# Patient Record
Sex: FEMALE | URBAN - METROPOLITAN AREA
[De-identification: names, ages, dates, MRNs, and addresses within clinical notes are randomized per-mention and may not be internally consistent; named-entity substitution may affect disease eponyms.]

---

## 2017-05-19 ENCOUNTER — APPOINTMENT (OUTPATIENT)
Age: 42
Setting detail: DERMATOLOGY
End: 2017-05-19

## 2017-05-19 ENCOUNTER — APPOINTMENT (RX ONLY)
Dept: URBAN - METROPOLITAN AREA CLINIC 168 | Facility: CLINIC | Age: 42
Setting detail: DERMATOLOGY
End: 2017-05-19

## 2017-05-19 ENCOUNTER — APPOINTMENT (RX ONLY)
Dept: URBAN - METROPOLITAN AREA CLINIC 77 | Facility: CLINIC | Age: 42
Setting detail: DERMATOLOGY
End: 2017-05-19

## 2017-05-19 DIAGNOSIS — L66.81 CENTRAL CENTRIFUGAL CICATRICIAL ALOPECIA: ICD-10-CM

## 2017-05-19 PROBLEM — Z85.3 PERSONAL HISTORY OF MALIGNANT NEOPLASM OF BREAST: Status: ACTIVE | Noted: 2017-05-19

## 2017-05-19 PROBLEM — I10 ESSENTIAL (PRIMARY) HYPERTENSION: Status: ACTIVE | Noted: 2017-05-19

## 2017-05-19 PROBLEM — J30.1 ALLERGIC RHINITIS DUE TO POLLEN: Status: ACTIVE | Noted: 2017-05-19

## 2017-05-19 PROBLEM — L65.9 NONSCARRING HAIR LOSS, UNSPECIFIED: Status: ACTIVE | Noted: 2017-05-19

## 2017-05-19 PROBLEM — L29.8 OTHER PRURITUS: Status: ACTIVE | Noted: 2017-05-19

## 2017-05-19 PROCEDURE — ? COUNSELING

## 2017-05-19 PROCEDURE — 99242 OFF/OP CONSLTJ NEW/EST SF 20: CPT

## 2017-05-19 ASSESSMENT — LOCATION ZONE DERM
LOCATION ZONE: SCALP

## 2017-05-19 ASSESSMENT — LOCATION SIMPLE DESCRIPTION DERM
LOCATION SIMPLE: POSTERIOR SCALP

## 2017-05-19 ASSESSMENT — LOCATION DETAILED DESCRIPTION DERM
LOCATION DETAILED: MID-OCCIPITAL SCALP

## 2017-05-25 ENCOUNTER — APPOINTMENT (RX ONLY)
Dept: URBAN - METROPOLITAN AREA CLINIC 168 | Facility: CLINIC | Age: 42
Setting detail: DERMATOLOGY
End: 2017-05-25

## 2017-05-25 ENCOUNTER — APPOINTMENT (OUTPATIENT)
Age: 42
Setting detail: DERMATOLOGY
End: 2017-05-25

## 2017-05-25 ENCOUNTER — APPOINTMENT (RX ONLY)
Dept: URBAN - METROPOLITAN AREA CLINIC 77 | Facility: CLINIC | Age: 42
Setting detail: DERMATOLOGY
End: 2017-05-25

## 2017-05-25 DIAGNOSIS — D485 NEOPLASM OF UNCERTAIN BEHAVIOR OF SKIN: ICD-10-CM

## 2017-05-25 PROBLEM — L29.8 OTHER PRURITUS: Status: ACTIVE | Noted: 2017-05-25

## 2017-05-25 PROBLEM — D48.5 NEOPLASM OF UNCERTAIN BEHAVIOR OF SKIN: Status: ACTIVE | Noted: 2017-05-25

## 2017-05-25 PROCEDURE — ? BIOPSY BY PUNCH METHOD

## 2017-05-25 PROCEDURE — 11100: CPT

## 2017-05-25 ASSESSMENT — LOCATION SIMPLE DESCRIPTION DERM
LOCATION SIMPLE: SCALP

## 2017-05-25 ASSESSMENT — LOCATION ZONE DERM
LOCATION ZONE: SCALP

## 2017-05-25 ASSESSMENT — LOCATION DETAILED DESCRIPTION DERM
LOCATION DETAILED: LEFT CENTRAL PARIETAL SCALP

## 2017-05-25 NOTE — PROCEDURE: BIOPSY BY PUNCH METHOD
Consent: Written consent was obtained and risks were reviewed including but not limited to scarring, infection, bleeding, scabbing, incomplete removal, nerve damage and allergy to anesthesia.
Suture Removal: 14 days
Notification Instructions: Patient will be notified of biopsy results. However, patient instructed to call the office if not contacted within 2 weeks.
Bill 28197 For Specimen Handling/Conveyance To Laboratory?: no
Hemostasis: None
Post-Care Instructions: I reviewed with the patient in detail post-care instructions. Patient is to keep the biopsy site dry overnight, and then apply bacitracin twice daily until healed. Patient may apply hydrogen peroxide soaks to remove any crusting.
Anesthesia Type: 1% lidocaine with epinephrine
Anesthesia Volume In Cc (Will Not Render If 0): 2
Epidermal Sutures: 4-0 Nylon
X Size Of Lesion In Cm (Optional): 0
Billing Type: [employer]
Wound Care: Polysporin ointment
Detail Level: Detailed
Biopsy Type: H and E
Punch Size In Mm: 4
Home Suture Removal Text: Patient was provided a home suture removal kit and will remove their sutures at home. If they have any questions or difficulties they will call the office.
Dressing: bandage

## 2017-06-08 ENCOUNTER — APPOINTMENT (RX ONLY)
Dept: URBAN - METROPOLITAN AREA CLINIC 168 | Facility: CLINIC | Age: 42
Setting detail: DERMATOLOGY
End: 2017-06-08

## 2017-06-08 ENCOUNTER — APPOINTMENT (OUTPATIENT)
Age: 42
Setting detail: DERMATOLOGY
End: 2017-06-08

## 2017-06-08 ENCOUNTER — APPOINTMENT (RX ONLY)
Dept: URBAN - METROPOLITAN AREA CLINIC 77 | Facility: CLINIC | Age: 42
Setting detail: DERMATOLOGY
End: 2017-06-08

## 2017-06-08 DIAGNOSIS — L66.81 CENTRAL CENTRIFUGAL CICATRICIAL ALOPECIA: ICD-10-CM

## 2017-06-08 DIAGNOSIS — L65.9 NONSCARRING HAIR LOSS, UNSPECIFIED: ICD-10-CM

## 2017-06-08 DIAGNOSIS — Z48.02 ENCOUNTER FOR REMOVAL OF SUTURES: ICD-10-CM

## 2017-06-08 PROBLEM — L66.8 OTHER CICATRICIAL ALOPECIA: Status: ACTIVE | Noted: 2017-06-08

## 2017-06-08 PROBLEM — Z85.3 PERSONAL HISTORY OF MALIGNANT NEOPLASM OF BREAST: Status: ACTIVE | Noted: 2017-06-08

## 2017-06-08 PROCEDURE — 99212 OFFICE O/P EST SF 10 MIN: CPT | Mod: 25

## 2017-06-08 PROCEDURE — 11901 INJECT SKIN LESIONS >7: CPT

## 2017-06-08 PROCEDURE — ? SUTURE REMOVAL (GLOBAL PERIOD)

## 2017-06-08 PROCEDURE — ? INTRALESIONAL KENALOG

## 2017-06-08 PROCEDURE — ? COUNSELING

## 2017-06-08 ASSESSMENT — LOCATION DETAILED DESCRIPTION DERM
LOCATION DETAILED: RIGHT CENTRAL FRONTAL SCALP
LOCATION DETAILED: RIGHT MEDIAL FRONTAL SCALP
LOCATION DETAILED: LEFT MEDIAL FRONTAL SCALP
LOCATION DETAILED: RIGHT CENTRAL PARIETAL SCALP
LOCATION DETAILED: RIGHT CENTRAL FRONTAL SCALP
LOCATION DETAILED: RIGHT CENTRAL PARIETAL SCALP
LOCATION DETAILED: RIGHT MEDIAL FRONTAL SCALP
LOCATION DETAILED: RIGHT SUPERIOR PARIETAL SCALP
LOCATION DETAILED: RIGHT MEDIAL FRONTAL SCALP
LOCATION DETAILED: RIGHT CENTRAL PARIETAL SCALP
LOCATION DETAILED: POSTERIOR MID-PARIETAL SCALP
LOCATION DETAILED: RIGHT SUPERIOR PARIETAL SCALP
LOCATION DETAILED: RIGHT CENTRAL FRONTAL SCALP
LOCATION DETAILED: POSTERIOR MID-PARIETAL SCALP
LOCATION DETAILED: RIGHT SUPERIOR PARIETAL SCALP
LOCATION DETAILED: POSTERIOR MID-PARIETAL SCALP

## 2017-06-08 ASSESSMENT — LOCATION SIMPLE DESCRIPTION DERM
LOCATION SIMPLE: RIGHT SCALP
LOCATION SIMPLE: SCALP
LOCATION SIMPLE: LEFT SCALP
LOCATION SIMPLE: POSTERIOR SCALP
LOCATION SIMPLE: SCALP
LOCATION SIMPLE: RIGHT SCALP
LOCATION SIMPLE: POSTERIOR SCALP
LOCATION SIMPLE: RIGHT SCALP
LOCATION SIMPLE: LEFT SCALP
LOCATION SIMPLE: POSTERIOR SCALP
LOCATION SIMPLE: SCALP
LOCATION SIMPLE: LEFT SCALP

## 2017-06-08 ASSESSMENT — LOCATION ZONE DERM
LOCATION ZONE: SCALP

## 2017-06-08 NOTE — PROCEDURE: INTRALESIONAL KENALOG
Include Z78.9 (Other Specified Conditions Influencing Health Status) As An Associated Diagnosis?: No
Consent: The risks of atrophy and pigment changes  were reviewed with the patient.
X Size Of Lesion In Cm (Optional): 0
Detail Level: Detailed
Total Volume Injected (Ccs- Only Use Numbers And Decimals): 2.0
Medical Necessity Clause: This procedure was medically necessary because the lesions that were treated were:
Concentration Of Solution Injected (Mg/Ml): 5.0
Kenalog Preparation: Kenalog
Administered By (Optional): MD

## 2017-06-08 NOTE — PROCEDURE: SUTURE REMOVAL (GLOBAL PERIOD)
Add 40132 Cpt? (Important Note: In 2017 The Use Of 44405 Is Being Tracked By Cms To Determine Future Global Period Reimbursement For Global Periods): no
Detail Level: Detailed

## 2017-06-09 ENCOUNTER — RX ONLY (OUTPATIENT)
Age: 42
Setting detail: RX ONLY
End: 2017-06-09

## 2017-06-09 ENCOUNTER — RX ONLY (RX ONLY)
Age: 42
End: 2017-06-09

## 2017-06-09 RX ORDER — CLOBETASOL PROPIONATE 0.5 MG/G
AEROSOL, FOAM TOPICAL
Qty: 1 | Refills: 1 | Status: CANCELLED

## 2017-06-09 RX ORDER — CLOBETASOL PROPIONATE 0.5 MG/G
AS DIRECTED AEROSOL, FOAM TOPICAL
Qty: 1 | Refills: 1 | Status: ERX | COMMUNITY
Start: 2017-06-09